# Patient Record
Sex: MALE
[De-identification: names, ages, dates, MRNs, and addresses within clinical notes are randomized per-mention and may not be internally consistent; named-entity substitution may affect disease eponyms.]

---

## 2024-06-06 LAB — HBA1C MFR BLD HPLC: 5.9 %

## 2024-08-21 ENCOUNTER — OFFICE VISIT (OUTPATIENT)
Facility: CLINIC | Age: 34
End: 2024-08-21
Payer: OTHER GOVERNMENT

## 2024-08-21 VITALS
SYSTOLIC BLOOD PRESSURE: 120 MMHG | OXYGEN SATURATION: 98 % | WEIGHT: 225.5 LBS | DIASTOLIC BLOOD PRESSURE: 70 MMHG | TEMPERATURE: 97.2 F | HEART RATE: 97 BPM | RESPIRATION RATE: 16 BRPM | HEIGHT: 74 IN | BODY MASS INDEX: 28.94 KG/M2

## 2024-08-21 DIAGNOSIS — R41.840 ATTENTION DEFICIT: ICD-10-CM

## 2024-08-21 DIAGNOSIS — E78.01 FAMILIAL HYPERCHOLESTEREMIA: Primary | ICD-10-CM

## 2024-08-21 DIAGNOSIS — Z91.89 AT RISK FOR OBSTRUCTIVE SLEEP APNEA: ICD-10-CM

## 2024-08-21 DIAGNOSIS — R73.03 PREDIABETES: ICD-10-CM

## 2024-08-21 PROCEDURE — 99204 OFFICE O/P NEW MOD 45 MIN: CPT | Performed by: STUDENT IN AN ORGANIZED HEALTH CARE EDUCATION/TRAINING PROGRAM

## 2024-08-21 RX ORDER — ROSUVASTATIN CALCIUM 40 MG/1
40 TABLET, COATED ORAL NIGHTLY
COMMUNITY
Start: 2024-06-17

## 2024-08-21 SDOH — ECONOMIC STABILITY: FOOD INSECURITY: WITHIN THE PAST 12 MONTHS, YOU WORRIED THAT YOUR FOOD WOULD RUN OUT BEFORE YOU GOT MONEY TO BUY MORE.: NEVER TRUE

## 2024-08-21 SDOH — ECONOMIC STABILITY: FOOD INSECURITY: WITHIN THE PAST 12 MONTHS, THE FOOD YOU BOUGHT JUST DIDN'T LAST AND YOU DIDN'T HAVE MONEY TO GET MORE.: NEVER TRUE

## 2024-08-21 SDOH — ECONOMIC STABILITY: INCOME INSECURITY: HOW HARD IS IT FOR YOU TO PAY FOR THE VERY BASICS LIKE FOOD, HOUSING, MEDICAL CARE, AND HEATING?: NOT HARD AT ALL

## 2024-08-21 ASSESSMENT — PATIENT HEALTH QUESTIONNAIRE - PHQ9
4. FEELING TIRED OR HAVING LITTLE ENERGY: SEVERAL DAYS
SUM OF ALL RESPONSES TO PHQ QUESTIONS 1-9: 3
6. FEELING BAD ABOUT YOURSELF - OR THAT YOU ARE A FAILURE OR HAVE LET YOURSELF OR YOUR FAMILY DOWN: NOT AT ALL
3. TROUBLE FALLING OR STAYING ASLEEP: SEVERAL DAYS
SUM OF ALL RESPONSES TO PHQ QUESTIONS 1-9: 3
10. IF YOU CHECKED OFF ANY PROBLEMS, HOW DIFFICULT HAVE THESE PROBLEMS MADE IT FOR YOU TO DO YOUR WORK, TAKE CARE OF THINGS AT HOME, OR GET ALONG WITH OTHER PEOPLE: NOT DIFFICULT AT ALL
5. POOR APPETITE OR OVEREATING: SEVERAL DAYS
2. FEELING DOWN, DEPRESSED OR HOPELESS: NOT AT ALL
SUM OF ALL RESPONSES TO PHQ QUESTIONS 1-9: 3
SUM OF ALL RESPONSES TO PHQ QUESTIONS 1-9: 3
SUM OF ALL RESPONSES TO PHQ9 QUESTIONS 1 & 2: 0
7. TROUBLE CONCENTRATING ON THINGS, SUCH AS READING THE NEWSPAPER OR WATCHING TELEVISION: NOT AT ALL
1. LITTLE INTEREST OR PLEASURE IN DOING THINGS: NOT AT ALL
8. MOVING OR SPEAKING SO SLOWLY THAT OTHER PEOPLE COULD HAVE NOTICED. OR THE OPPOSITE, BEING SO FIGETY OR RESTLESS THAT YOU HAVE BEEN MOVING AROUND A LOT MORE THAN USUAL: NOT AT ALL
9. THOUGHTS THAT YOU WOULD BE BETTER OFF DEAD, OR OF HURTING YOURSELF: NOT AT ALL

## 2024-08-21 NOTE — PROGRESS NOTES
David Russell (: 1990) is a 33 y.o. male, new patient, here for evaluation of the following chief complaint(s):  Establish Care (No concerns)        Assessment & Plan  1.  Familial hypercholesterolemia  The patient's LDL cholesterol was 233 which is suggestive of familial hypercholesterolemia.   He has been on rosuvastatin but reported inconsistency in taking the medication due to a recent move. He has resumed regular use for the past week and a half. The benefits and potential side effects of rosuvastatin, including muscle aches, were discussed. He was advised to continue taking rosuvastatin at the current dosage. A fasting blood draw will be scheduled in a month to monitor his cholesterol levels and determine if further adjustments are needed.    2. Sleep Apnea.  The patient reports symptoms suggestive of sleep apnea, including snoring and pausing breathing during sleep. He has a referral for a sleep study from a previous provider but was unable to complete it due to illness. A new referral to a sleep medicine specialist will be made for further evaluation.    3. Prediabetes.  The patient's A1c level is 5.9, indicating borderline prediabetes. He was advised to maintain a healthy diet, focusing on moderating carbohydrate intake and avoiding concentrated sweets like juice, sweet tea and sodas. His blood sugar levels will be rechecked during his next visit.    4. ADHD.  The patient has a history of ADHD and was previously on Vyvanse until about three years ago. He expressed interest in resuming the medication. He was advised to bring his old medical records, including the original diagnosis and treatment details, to the next visit for further discussion about restarting Vyvanse.    Follow-up  The patient will follow up in 1 month.    Results  Laboratory Studies  Cholesterol levels were high. A1c was 5.9.  1. At risk for obstructive sleep apnea  -     Amb External Referral To Sleep Medicine  2.

## 2024-08-21 NOTE — PROGRESS NOTES
Chief Complaint   Patient presents with    Establish Care     No concerns       1. \"Have you been to the ER, urgent care clinic since your last visit?  Hospitalized since your last visit?\" No    2. \"Have you seen or consulted any other health care providers outside of the [facility] since your last visit?\" No     3. For patients aged 45-75: Has the patient had a colonoscopy / FIT/ Cologuard? NA - based on age

## 2024-09-14 ENCOUNTER — HOSPITAL ENCOUNTER (OUTPATIENT)
Facility: HOSPITAL | Age: 34
Discharge: HOME | End: 2024-09-17
Payer: OTHER GOVERNMENT

## 2024-09-14 DIAGNOSIS — R73.03 PREDIABETES: ICD-10-CM

## 2024-09-14 DIAGNOSIS — E78.01 FAMILIAL HYPERCHOLESTEREMIA: ICD-10-CM

## 2024-09-14 LAB
CHOLEST SERPL-MCNC: 161 MG/DL
EST. AVERAGE GLUCOSE BLD GHB EST-MCNC: 117 MG/DL
HBA1C MFR BLD: 5.7 % (ref 4.2–5.6)
HDLC SERPL-MCNC: 55 MG/DL (ref 40–60)
HDLC SERPL: 2.9 (ref 0–5)
LDLC SERPL CALC-MCNC: 85.2 MG/DL (ref 0–100)
LIPID PANEL: NORMAL
TRIGL SERPL-MCNC: 104 MG/DL
VLDLC SERPL CALC-MCNC: 20.8 MG/DL

## 2024-09-14 PROCEDURE — 83036 HEMOGLOBIN GLYCOSYLATED A1C: CPT

## 2024-09-14 PROCEDURE — 36415 COLL VENOUS BLD VENIPUNCTURE: CPT

## 2024-09-14 PROCEDURE — 80061 LIPID PANEL: CPT

## 2024-09-24 ENCOUNTER — OFFICE VISIT (OUTPATIENT)
Facility: CLINIC | Age: 34
End: 2024-09-24
Payer: OTHER GOVERNMENT

## 2024-09-24 ENCOUNTER — TELEPHONE (OUTPATIENT)
Facility: CLINIC | Age: 34
End: 2024-09-24

## 2024-09-24 VITALS
TEMPERATURE: 97.3 F | WEIGHT: 232.4 LBS | HEART RATE: 90 BPM | SYSTOLIC BLOOD PRESSURE: 110 MMHG | DIASTOLIC BLOOD PRESSURE: 76 MMHG | HEIGHT: 74 IN | OXYGEN SATURATION: 97 % | BODY MASS INDEX: 29.82 KG/M2

## 2024-09-24 DIAGNOSIS — F90.2 ATTENTION DEFICIT HYPERACTIVITY DISORDER (ADHD), COMBINED TYPE: Primary | ICD-10-CM

## 2024-09-24 DIAGNOSIS — E78.01 FAMILIAL HYPERCHOLESTEREMIA: ICD-10-CM

## 2024-09-24 DIAGNOSIS — F90.2 ATTENTION DEFICIT HYPERACTIVITY DISORDER (ADHD), COMBINED TYPE: ICD-10-CM

## 2024-09-24 PROCEDURE — 99214 OFFICE O/P EST MOD 30 MIN: CPT | Performed by: STUDENT IN AN ORGANIZED HEALTH CARE EDUCATION/TRAINING PROGRAM

## 2024-09-24 RX ORDER — LISDEXAMFETAMINE DIMESYLATE 70 MG/1
70 CAPSULE ORAL DAILY
Qty: 30 CAPSULE | Refills: 0 | Status: SHIPPED | OUTPATIENT
Start: 2024-09-24 | End: 2024-10-24

## 2024-09-24 RX ORDER — ROSUVASTATIN CALCIUM 40 MG/1
40 TABLET, COATED ORAL NIGHTLY
Qty: 90 TABLET | Refills: 3 | Status: SHIPPED | OUTPATIENT
Start: 2024-09-24

## 2024-09-24 RX ORDER — DEXTROAMPHETAMINE SACCHARATE, AMPHETAMINE ASPARTATE, DEXTROAMPHETAMINE SULFATE AND AMPHETAMINE SULFATE 5; 5; 5; 5 MG/1; MG/1; MG/1; MG/1
20 TABLET ORAL DAILY
Qty: 30 TABLET | Refills: 0 | Status: SHIPPED | OUTPATIENT
Start: 2024-11-23 | End: 2024-12-23

## 2024-09-24 RX ORDER — LISDEXAMFETAMINE DIMESYLATE 70 MG/1
70 CAPSULE ORAL DAILY
Qty: 30 CAPSULE | Refills: 0 | Status: SHIPPED | OUTPATIENT
Start: 2024-11-23 | End: 2024-12-23

## 2024-09-24 RX ORDER — DEXTROAMPHETAMINE SACCHARATE, AMPHETAMINE ASPARTATE, DEXTROAMPHETAMINE SULFATE AND AMPHETAMINE SULFATE 5; 5; 5; 5 MG/1; MG/1; MG/1; MG/1
20 TABLET ORAL DAILY
Qty: 30 TABLET | Refills: 0 | Status: SHIPPED | OUTPATIENT
Start: 2024-09-24 | End: 2024-10-24

## 2024-09-24 RX ORDER — DEXTROAMPHETAMINE SACCHARATE, AMPHETAMINE ASPARTATE, DEXTROAMPHETAMINE SULFATE AND AMPHETAMINE SULFATE 5; 5; 5; 5 MG/1; MG/1; MG/1; MG/1
20 TABLET ORAL DAILY
Qty: 30 TABLET | Refills: 0 | Status: SHIPPED | OUTPATIENT
Start: 2024-10-24 | End: 2024-11-23

## 2024-09-24 RX ORDER — LISDEXAMFETAMINE DIMESYLATE 70 MG/1
70 CAPSULE ORAL DAILY
Qty: 30 CAPSULE | Refills: 0 | Status: SHIPPED | OUTPATIENT
Start: 2024-10-24 | End: 2024-11-23

## 2024-09-26 RX ORDER — LISDEXAMFETAMINE DIMESYLATE 70 MG/1
70 CAPSULE ORAL EVERY MORNING
Qty: 30 CAPSULE | Refills: 0 | Status: SHIPPED | OUTPATIENT
Start: 2024-10-25 | End: 2024-11-24

## 2024-09-26 RX ORDER — LISDEXAMFETAMINE DIMESYLATE 70 MG/1
70 CAPSULE ORAL EVERY MORNING
Qty: 30 CAPSULE | Refills: 0 | Status: SHIPPED | OUTPATIENT
Start: 2024-11-25 | End: 2024-12-24

## 2024-09-26 RX ORDER — LISDEXAMFETAMINE DIMESYLATE 70 MG/1
70 CAPSULE ORAL EVERY MORNING
Qty: 30 CAPSULE | Refills: 0 | Status: SHIPPED | OUTPATIENT
Start: 2024-09-26 | End: 2024-10-26

## 2024-09-30 ENCOUNTER — TELEPHONE (OUTPATIENT)
Facility: CLINIC | Age: 34
End: 2024-09-30

## 2024-12-30 ENCOUNTER — OFFICE VISIT (OUTPATIENT)
Facility: CLINIC | Age: 34
End: 2024-12-30
Payer: OTHER GOVERNMENT

## 2024-12-30 VITALS
RESPIRATION RATE: 16 BRPM | DIASTOLIC BLOOD PRESSURE: 78 MMHG | WEIGHT: 239.25 LBS | TEMPERATURE: 96.1 F | OXYGEN SATURATION: 96 % | SYSTOLIC BLOOD PRESSURE: 120 MMHG | HEART RATE: 85 BPM | BODY MASS INDEX: 30.93 KG/M2

## 2024-12-30 DIAGNOSIS — F90.2 ATTENTION DEFICIT HYPERACTIVITY DISORDER (ADHD), COMBINED TYPE: Primary | ICD-10-CM

## 2024-12-30 PROCEDURE — 99214 OFFICE O/P EST MOD 30 MIN: CPT | Performed by: STUDENT IN AN ORGANIZED HEALTH CARE EDUCATION/TRAINING PROGRAM

## 2024-12-30 RX ORDER — LISDEXAMFETAMINE DIMESYLATE 70 MG/1
70 CAPSULE ORAL DAILY
Qty: 30 CAPSULE | Refills: 0 | Status: SHIPPED | OUTPATIENT
Start: 2025-01-29 | End: 2025-02-28

## 2024-12-30 RX ORDER — LISDEXAMFETAMINE DIMESYLATE 70 MG/1
70 CAPSULE ORAL DAILY
Qty: 30 CAPSULE | Refills: 0 | Status: SHIPPED | OUTPATIENT
Start: 2025-02-28 | End: 2025-03-30

## 2024-12-30 RX ORDER — DEXTROAMPHETAMINE SACCHARATE, AMPHETAMINE ASPARTATE, DEXTROAMPHETAMINE SULFATE AND AMPHETAMINE SULFATE 5; 5; 5; 5 MG/1; MG/1; MG/1; MG/1
20 TABLET ORAL DAILY
Qty: 30 TABLET | Refills: 0 | Status: SHIPPED | OUTPATIENT
Start: 2025-01-29 | End: 2025-02-28

## 2024-12-30 RX ORDER — DEXTROAMPHETAMINE SACCHARATE, AMPHETAMINE ASPARTATE, DEXTROAMPHETAMINE SULFATE AND AMPHETAMINE SULFATE 5; 5; 5; 5 MG/1; MG/1; MG/1; MG/1
20 TABLET ORAL DAILY
Qty: 30 TABLET | Refills: 0 | Status: SHIPPED | OUTPATIENT
Start: 2025-02-28 | End: 2025-03-30

## 2024-12-30 RX ORDER — DEXTROAMPHETAMINE SACCHARATE, AMPHETAMINE ASPARTATE, DEXTROAMPHETAMINE SULFATE AND AMPHETAMINE SULFATE 5; 5; 5; 5 MG/1; MG/1; MG/1; MG/1
20 TABLET ORAL DAILY
Qty: 30 TABLET | Refills: 0 | Status: SHIPPED | OUTPATIENT
Start: 2024-12-30 | End: 2025-01-29

## 2024-12-30 RX ORDER — LISDEXAMFETAMINE DIMESYLATE 70 MG/1
70 CAPSULE ORAL DAILY
Qty: 30 CAPSULE | Refills: 0 | Status: SHIPPED | OUTPATIENT
Start: 2024-12-30 | End: 2025-01-29

## 2024-12-30 NOTE — PROGRESS NOTES
David Russell (: 1990) is a 34 y.o. male, established patient, here for evaluation of the following chief complaint(s):  ADHD (Follow up no concerns.)        Assessment & Plan  1.  ADHD  -repeat blood pressure is 120/78 mmHg.  No significant side effects noted  -  reviewed and appropriate.   - Continue current medication regimen  - UDS and CSC up to date  - Refills sent to [facility]    2.  Familial hypercholesterolemia- Currently on 40 mg rosuvastatin  -Spouse concerned about potential long-term risks, including liver damage and diabetes.  Discussed balancing the risks of very high cholesterol and the risks of statins.  - Cholesterol levels were very high when off medication  - Continue current dosage  - Monitor liver function a during next workup    Follow-up in 3 months    Results    1. Attention deficit hyperactivity disorder (ADHD), combined type  -     lisdexamfetamine (VYVANSE) 70 MG capsule; Take 1 capsule by mouth daily for 30 days. Max Daily Amount: 70 mg, Disp-30 capsule, R-0Normal  -     lisdexamfetamine (VYVANSE) 70 MG capsule; Take 1 capsule by mouth daily for 30 days. Max Daily Amount: 70 mg, Disp-30 capsule, R-0Normal  -     lisdexamfetamine (VYVANSE) 70 MG capsule; Take 1 capsule by mouth daily for 30 days. Max Daily Amount: 70 mg, Disp-30 capsule, R-0Normal  -     amphetamine-dextroamphetamine (ADDERALL) 20 MG tablet; Take 1 tablet by mouth daily for 30 days. Max Daily Amount: 20 mg, Disp-30 tablet, R-0Normal  -     amphetamine-dextroamphetamine (ADDERALL) 20 MG tablet; Take 1 tablet by mouth daily for 30 days. Max Daily Amount: 20 mg, Disp-30 tablet, R-0Normal  -     amphetamine-dextroamphetamine (ADDERALL) 20 MG tablet; Take 1 tablet by mouth daily for 30 days. Max Daily Amount: 20 mg, Disp-30 tablet, R-0Normal    Return in 3 months (on 3/30/2025) for ADHD follow up.         Subjective   History of Present Illness  The patient presents for evaluation of blood pressure and

## 2024-12-31 ENCOUNTER — TELEPHONE (OUTPATIENT)
Facility: CLINIC | Age: 34
End: 2024-12-31

## 2024-12-31 DIAGNOSIS — F90.2 ATTENTION DEFICIT HYPERACTIVITY DISORDER (ADHD), COMBINED TYPE: Primary | ICD-10-CM

## 2024-12-31 NOTE — TELEPHONE ENCOUNTER
Fax received from Cover [employer] stating prior auth is required for thel  lisdexamfetamine (VYVANSE) 70 MG capsule    Submit a PA request  1. Go to [email or web address] and click \"Enter a Key\"  2. Patient last name: Drew      : 1990      Key: [id number]  3. Click \"start a PA\", complete the form, and \"send to plan\"

## 2025-01-02 NOTE — TELEPHONE ENCOUNTER
Vyvanse should be covered as brand name. Tried to call [facility] to see if they can run through insurance as brand name no answer will call back later.

## 2025-01-07 ENCOUNTER — TELEPHONE (OUTPATIENT)
Facility: CLINIC | Age: 35
End: 2025-01-07

## 2025-01-07 RX ORDER — LISDEXAMFETAMINE DIMESYLATE 70 MG/1
70 CAPSULE ORAL EVERY MORNING
Qty: 30 CAPSULE | Refills: 0 | Status: SHIPPED | OUTPATIENT
Start: 2025-03-08 | End: 2025-04-07

## 2025-01-07 RX ORDER — LISDEXAMFETAMINE DIMESYLATE 70 MG/1
70 CAPSULE ORAL EVERY MORNING
Qty: 30 CAPSULE | Refills: 0 | Status: SHIPPED | OUTPATIENT
Start: 2025-01-07 | End: 2025-02-06

## 2025-01-07 RX ORDER — LISDEXAMFETAMINE DIMESYLATE 70 MG/1
70 CAPSULE ORAL EVERY MORNING
Qty: 30 CAPSULE | Refills: 0 | Status: SHIPPED | OUTPATIENT
Start: 2025-02-06 | End: 2025-03-08

## 2025-01-07 NOTE — TELEPHONE ENCOUNTER
Fax received from [employer] stating prior auth is required for the lisdexamfetamine (VYVANSE) 70 MG capsule   Submit a PA request  1. Go to [email or web address] and click \"Enter a Key\"  2. Patient last name: Drew      : [birth year]      Key: [id number]  3. Click \"start a PA\", complete the form, and \"send to plan\"

## 2025-01-10 ENCOUNTER — TELEPHONE (OUTPATIENT)
Facility: CLINIC | Age: 35
End: 2025-01-10

## 2025-01-10 NOTE — TELEPHONE ENCOUNTER
Close reason: Prior Authorization not required for patient/medication  Payer: Auto Search Patient's Payer Case ID: [id number]    [phone]  Note from payer: [facility] has predicted that this prior auth will not be required.  View History

## 2025-03-31 ENCOUNTER — TELEPHONE (OUTPATIENT)
Facility: CLINIC | Age: 35
End: 2025-03-31

## 2025-03-31 ENCOUNTER — OFFICE VISIT (OUTPATIENT)
Facility: CLINIC | Age: 35
End: 2025-03-31
Payer: OTHER GOVERNMENT

## 2025-03-31 VITALS
TEMPERATURE: 96.7 F | RESPIRATION RATE: 16 BRPM | HEIGHT: 74 IN | DIASTOLIC BLOOD PRESSURE: 82 MMHG | HEART RATE: 96 BPM | SYSTOLIC BLOOD PRESSURE: 124 MMHG | WEIGHT: 236.38 LBS | BODY MASS INDEX: 30.34 KG/M2 | OXYGEN SATURATION: 96 %

## 2025-03-31 DIAGNOSIS — F90.2 ATTENTION DEFICIT HYPERACTIVITY DISORDER (ADHD), COMBINED TYPE: Primary | ICD-10-CM

## 2025-03-31 DIAGNOSIS — J30.1 SEASONAL ALLERGIC RHINITIS DUE TO POLLEN: ICD-10-CM

## 2025-03-31 PROCEDURE — 99214 OFFICE O/P EST MOD 30 MIN: CPT | Performed by: STUDENT IN AN ORGANIZED HEALTH CARE EDUCATION/TRAINING PROGRAM

## 2025-03-31 RX ORDER — LISDEXAMFETAMINE DIMESYLATE 70 MG/1
70 CAPSULE ORAL DAILY
Qty: 30 CAPSULE | Refills: 0 | Status: SHIPPED | OUTPATIENT
Start: 2025-03-31 | End: 2025-04-30

## 2025-03-31 RX ORDER — DEXTROAMPHETAMINE SACCHARATE, AMPHETAMINE ASPARTATE, DEXTROAMPHETAMINE SULFATE AND AMPHETAMINE SULFATE 5; 5; 5; 5 MG/1; MG/1; MG/1; MG/1
20 TABLET ORAL DAILY
Qty: 30 TABLET | Refills: 0 | Status: SHIPPED | OUTPATIENT
Start: 2025-05-30 | End: 2025-06-29

## 2025-03-31 RX ORDER — DEXTROAMPHETAMINE SACCHARATE, AMPHETAMINE ASPARTATE, DEXTROAMPHETAMINE SULFATE AND AMPHETAMINE SULFATE 5; 5; 5; 5 MG/1; MG/1; MG/1; MG/1
20 TABLET ORAL DAILY
Qty: 30 TABLET | Refills: 0 | Status: SHIPPED | OUTPATIENT
Start: 2025-04-30 | End: 2025-05-30

## 2025-03-31 RX ORDER — LISDEXAMFETAMINE DIMESYLATE 70 MG/1
70 CAPSULE ORAL DAILY
Qty: 30 CAPSULE | Refills: 0 | Status: SHIPPED | OUTPATIENT
Start: 2025-04-30 | End: 2025-05-30

## 2025-03-31 RX ORDER — DEXTROAMPHETAMINE SACCHARATE, AMPHETAMINE ASPARTATE, DEXTROAMPHETAMINE SULFATE AND AMPHETAMINE SULFATE 5; 5; 5; 5 MG/1; MG/1; MG/1; MG/1
20 TABLET ORAL DAILY
Qty: 30 TABLET | Refills: 0 | Status: SHIPPED | OUTPATIENT
Start: 2025-03-31 | End: 2025-04-30

## 2025-03-31 RX ORDER — LISDEXAMFETAMINE DIMESYLATE 70 MG/1
70 CAPSULE ORAL DAILY
Qty: 30 CAPSULE | Refills: 0 | Status: SHIPPED | OUTPATIENT
Start: 2025-05-30 | End: 2025-06-29

## 2025-03-31 SDOH — ECONOMIC STABILITY: FOOD INSECURITY: WITHIN THE PAST 12 MONTHS, THE FOOD YOU BOUGHT JUST DIDN'T LAST AND YOU DIDN'T HAVE MONEY TO GET MORE.: NEVER TRUE

## 2025-03-31 SDOH — ECONOMIC STABILITY: FOOD INSECURITY: WITHIN THE PAST 12 MONTHS, YOU WORRIED THAT YOUR FOOD WOULD RUN OUT BEFORE YOU GOT MONEY TO BUY MORE.: NEVER TRUE

## 2025-03-31 ASSESSMENT — PATIENT HEALTH QUESTIONNAIRE - PHQ9
1. LITTLE INTEREST OR PLEASURE IN DOING THINGS: NOT AT ALL
2. FEELING DOWN, DEPRESSED OR HOPELESS: NOT AT ALL
SUM OF ALL RESPONSES TO PHQ QUESTIONS 1-9: 0

## 2025-03-31 NOTE — TELEPHONE ENCOUNTER
Close reason: Prior Authorization not required for patient/medication  Payer: Auto Search Patient's Payer Case ID: [id number]    [phone]  Note from payer: [facility] has predicted that this prior auth will not be required.  View History

## 2025-03-31 NOTE — PROGRESS NOTES
\"Have you been to the ER, urgent care clinic since your last visit?  Hospitalized since your last visit?\"    YES - When: approximately 1 months ago.  Where and Why: eye scratch.    “Have you seen or consulted any other health care providers outside our system since your last visit?”    NO

## 2025-03-31 NOTE — ASSESSMENT & PLAN NOTE
ADHD Diagnosis  - Several inattentive or hyperactive-impulsive symptoms have been present prior to age 14yo.  - Symptoms not better accounted for by a different psychiatric disorder  - Symptoms not exclusively a manifestation of oppositional behavior.   - Symptoms are interfering with social and work functioning.   - Several symptoms have been present greater than 6 months and are present in at least 2 settings. 5 or more symptoms are present when not adequately on treatment. See ASRS  - Medication history: Vyvanse, adderall.  (Ritalin. Concerta. Caused headaches)  - Prior Neuropsych testing- N/A. Diagnosed at 19yo in [place]. Started on medications during that time.   -  reviewed and appropriate   - [facility] on file  - Last UDS 9/24/24

## 2025-03-31 NOTE — PROGRESS NOTES
David Russell (: 1990) is a 34 y.o. male, established patient, here for evaluation of the following chief complaint(s):  3 Month Follow-Up (No concerns, patient would like to get something for allergies.)        Assessment & Plan  1. Allergic rhinitis: Worsening of chronic seasonal condition.  - Recommend Zaditor eye drops twice daily.  - Suggest Zyrtec-D if not used, caution for potential BP elevation.  - Recommend Flonase nasal spray for sinus symptoms, reduce frequency if epistaxis occurs.  - Advise artificial tears to rinse eyes and remove allergens.  - Recommend wearing a mask in john or pollen-rich environments.  - If symptoms persist after 1-2 weeks, consider Singulair (montelukast). Discussed potential neuropsychiatric side effects.    2. ADHD: Stable.  No significant side effects noted.   reviewed and appropriate  - No current lightheadedness, dizziness, palpitations, or sleep disturbances.  - Afternoon dose of Adderall taken around 1-2 PM.  - Urine drug screen conducted in 2024.  - No issues with medication pickup, aside from prior authorization delays.  -Continue current management with Adderall and Vyvanse    Follow-up  - Follow-up in 3 months.    Results    1. Attention deficit hyperactivity disorder (ADHD), combined type  Assessment & Plan:   ADHD Diagnosis  - Several inattentive or hyperactive-impulsive symptoms have been present prior to age 14yo.  - Symptoms not better accounted for by a different psychiatric disorder  - Symptoms not exclusively a manifestation of oppositional behavior.   - Symptoms are interfering with social and work functioning.   - Several symptoms have been present greater than 6 months and are present in at least 2 settings. 5 or more symptoms are present when not adequately on treatment. See ASRS  - Medication history: Vyvanse, adderall.  (Ritalin. Concerta. Caused headaches)  - Prior Neuropsych testing- N/A. Diagnosed at 19yo in [place]. Started on

## 2025-05-06 ENCOUNTER — TELEPHONE (OUTPATIENT)
Facility: CLINIC | Age: 35
End: 2025-05-06

## 2025-05-06 NOTE — TELEPHONE ENCOUNTER
Patient is requesting (New  Updated) referral to the following office:    Speciality: Sleep Medicine  Specialist Name: unknown  Office Location: unknown  Phone (if available): unknown  Fax (if available): unknown  Diagnosis: Z91.89 (ICD-10-CM) - At risk for obstructive sleep apnea   Date of appointment (if scheduled): unknown    PATIENT REQUESTED NOT TO BE REFERRED TO SAME SLEEP MEDICINE (FROM 08/21/24.)

## 2025-05-21 DIAGNOSIS — Z91.89 AT RISK FOR OBSTRUCTIVE SLEEP APNEA: Primary | ICD-10-CM

## 2025-06-06 RX ORDER — CYCLOBENZAPRINE HCL 10 MG
10 TABLET ORAL 3 TIMES DAILY PRN
COMMUNITY

## 2025-06-06 RX ORDER — METHOCARBAMOL 750 MG/1
750 TABLET, FILM COATED ORAL EVERY 4 HOURS PRN
COMMUNITY
Start: 2025-05-25

## 2025-06-06 RX ORDER — CHLOPHEDIANOL HCL AND PYRILAMINE MALEATE 12.5; 12.5 MG/5ML; MG/5ML
10 SOLUTION ORAL EVERY 8 HOURS PRN
COMMUNITY

## 2025-06-06 RX ORDER — SENNOSIDES 8.6 MG
1300 CAPSULE ORAL EVERY 8 HOURS PRN
COMMUNITY
Start: 2025-05-25

## 2025-06-06 RX ORDER — LIDOCAINE 50 MG/G
1 PATCH TOPICAL EVERY 24 HOURS
COMMUNITY
Start: 2025-05-25

## 2025-06-06 RX ORDER — IBUPROFEN 800 MG/1
800 TABLET, FILM COATED ORAL EVERY 6 HOURS PRN
COMMUNITY
Start: 2025-05-25

## 2025-06-10 ENCOUNTER — OFFICE VISIT (OUTPATIENT)
Age: 35
End: 2025-06-10
Payer: OTHER GOVERNMENT

## 2025-06-10 VITALS
SYSTOLIC BLOOD PRESSURE: 135 MMHG | DIASTOLIC BLOOD PRESSURE: 79 MMHG | OXYGEN SATURATION: 97 % | HEART RATE: 102 BPM | WEIGHT: 230 LBS | TEMPERATURE: 97.6 F | BODY MASS INDEX: 30.48 KG/M2 | RESPIRATION RATE: 18 BRPM | HEIGHT: 73 IN

## 2025-06-10 DIAGNOSIS — G47.33 OSA (OBSTRUCTIVE SLEEP APNEA): Primary | ICD-10-CM

## 2025-06-10 DIAGNOSIS — F90.2 ATTENTION DEFICIT HYPERACTIVITY DISORDER (ADHD), COMBINED TYPE: ICD-10-CM

## 2025-06-10 PROCEDURE — 99204 OFFICE O/P NEW MOD 45 MIN: CPT | Performed by: INTERNAL MEDICINE

## 2025-06-10 ASSESSMENT — PATIENT HEALTH QUESTIONNAIRE - PHQ9
SUM OF ALL RESPONSES TO PHQ QUESTIONS 1-9: 0
2. FEELING DOWN, DEPRESSED OR HOPELESS: NOT AT ALL
SUM OF ALL RESPONSES TO PHQ QUESTIONS 1-9: 0
1. LITTLE INTEREST OR PLEASURE IN DOING THINGS: NOT AT ALL
SUM OF ALL RESPONSES TO PHQ QUESTIONS 1-9: 0
SUM OF ALL RESPONSES TO PHQ QUESTIONS 1-9: 0

## 2025-06-10 ASSESSMENT — SLEEP AND FATIGUE QUESTIONNAIRES
HOW LIKELY ARE YOU TO NOD OFF OR FALL ASLEEP WHILE LYING DOWN TO REST IN THE AFTERNOON WHEN CIRCUMSTANCES PERMIT: SLIGHT CHANCE OF DOZING
HOW LIKELY ARE YOU TO NOD OFF OR FALL ASLEEP WHILE SITTING QUIETLY AFTER LUNCH WITHOUT ALCOHOL: SLIGHT CHANCE OF DOZING
HOW LIKELY ARE YOU TO NOD OFF OR FALL ASLEEP WHILE SITTING AND TALKING TO SOMEONE: WOULD NEVER DOZE
HOW LIKELY ARE YOU TO NOD OFF OR FALL ASLEEP WHILE WATCHING TV: SLIGHT CHANCE OF DOZING
HOW LIKELY ARE YOU TO NOD OFF OR FALL ASLEEP WHILE SITTING AND READING: SLIGHT CHANCE OF DOZING
HOW LIKELY ARE YOU TO NOD OFF OR FALL ASLEEP WHEN YOU ARE A PASSENGER IN A CAR FOR AN HOUR WITHOUT A BREAK: SLIGHT CHANCE OF DOZING
ESS TOTAL SCORE: 6
HOW LIKELY ARE YOU TO NOD OFF OR FALL ASLEEP WHILE SITTING INACTIVE IN A PUBLIC PLACE: SLIGHT CHANCE OF DOZING
HOW LIKELY ARE YOU TO NOD OFF OR FALL ASLEEP IN A CAR, WHILE STOPPED FOR A FEW MINUTES IN TRAFFIC: WOULD NEVER DOZE

## 2025-06-10 NOTE — PROGRESS NOTES
[facility] since your last visit? Include any pap smears or colon screening. no    Medication list has been updated according to patient.  
mouth every 8 hours as needed (Patient not taking: Reported on 6/10/2025)      cyclobenzaprine (FLEXERIL) 10 MG tablet Take 1 tablet by mouth 3 times daily as needed for Muscle spasms (Patient not taking: Reported on 6/10/2025)      diclofenac (VOLTAREN) 50 MG EC tablet Take 1 tablet by mouth 3 times daily (Patient not taking: Reported on 6/10/2025)      lidocaine (LIDODERM) 5 % Place 1 patch onto the skin every 24 hours  (12 hours on, 12 hours off) (Patient not taking: Reported on 6/10/2025)      methocarbamol (ROBAXIN) 750 MG tablet Take 1 tablet by mouth every 4 hours as needed (pain) (Patient not taking: Reported on 6/10/2025)      lisdexamfetamine (VYVANSE) 70 MG capsule Take 1 capsule by mouth daily for 30 days. Max Daily Amount: 70 mg 30 capsule 0    lisdexamfetamine (VYVANSE) 70 MG capsule Take 1 capsule by mouth daily for 30 days. Max Daily Amount: 70 mg 30 capsule 0    amphetamine-dextroamphetamine (ADDERALL) 20 MG tablet Take 1 tablet by mouth daily for 30 days. Max Daily Amount: 20 mg 30 tablet 0    amphetamine-dextroamphetamine (ADDERALL) 20 MG tablet Take 1 tablet by mouth daily for 30 days. Max Daily Amount: 20 mg 30 tablet 0    amphetamine-dextroamphetamine (ADDERALL) 20 MG tablet Take 1 tablet by mouth daily for 30 days. Max Daily Amount: 20 mg 30 tablet 0    VYVANSE 70 MG capsule Take 1 capsule by mouth every morning for 30 days. Max Daily Amount: 70 mg 30 capsule 0    VYVANSE 70 MG capsule Take 1 capsule by mouth every morning for 30 days. Max Daily Amount: 70 mg 30 capsule 0    VYVANSE 70 MG capsule Take 1 capsule by mouth every morning for 30 days. Max Daily Amount: 70 mg 30 capsule 0    lisdexamfetamine (VYVANSE) 70 MG capsule Take 1 capsule by mouth daily for 30 days. Max Daily Amount: 70 mg 30 capsule 0    amphetamine-dextroamphetamine (ADDERALL) 20 MG tablet Take 1 tablet by mouth daily for 30 days. Max Daily Amount: 20 mg 30 tablet 0    amphetamine-dextroamphetamine (ADDERALL) 20 MG tablet

## 2025-06-10 NOTE — PATIENT INSTRUCTIONS
until you feel sleepy.  Avoid taking medicine right before bed that may keep you awake or make you feel hyper or energized. Your doctor can tell you if your medicine may do this and if you can take it earlier in the day.  If you can't sleep   Imagine yourself in a peaceful, pleasant scene. Focus on the details and feelings of being in a place that is relaxing.  Get up and do a quiet or boring activity until you feel sleepy.  Avoid drinking any liquids before going to bed to help prevent waking up often to use the bathroom.  Where can you learn more?  Go to https://www.[employer].net/patientEd and enter J942 to learn more about \"Learning About Sleeping Well.\"  Current as of: July 31, 2024  Content Version: 14.5  © 2024-2025 [employer].   Care instructions adapted under license by [employer]. If you have questions about a medical condition or this instruction, always ask your healthcare professional. [employer] disclaims any warranty or liability for your use of this information.

## 2025-06-16 ENCOUNTER — HOSPITAL ENCOUNTER (OUTPATIENT)
Dept: SLEEP MEDICINE | Facility: HOSPITAL | Age: 35
Discharge: HOME | End: 2025-06-19
Attending: INTERNAL MEDICINE
Payer: OTHER GOVERNMENT

## 2025-06-16 DIAGNOSIS — G47.33 MODERATE OBSTRUCTIVE SLEEP APNEA: Primary | ICD-10-CM

## 2025-06-16 DIAGNOSIS — G47.33 OSA (OBSTRUCTIVE SLEEP APNEA): ICD-10-CM

## 2025-06-16 PROCEDURE — 95800 SLP STDY UNATTENDED: CPT

## 2025-06-17 PROBLEM — G47.33 MODERATE OBSTRUCTIVE SLEEP APNEA: Status: ACTIVE | Noted: 2025-06-17

## 2025-06-17 PROCEDURE — 95800 SLP STDY UNATTENDED: CPT | Performed by: INTERNAL MEDICINE

## 2025-06-17 NOTE — PROCEDURES
Vannessa Lockett M.D  Pulmonary Critical Care & Sleep Medicine    Sleep Study Report      HST reported on 6/17/25   INDICATION: ESS of 7 snoring snore arousal Mallampati of 3 and strong possibility of underlying sleep apnea   CONCLUSION     Moderate  obstructive sleep apnea syndrome with AHI of 20, and respiratory disturbance index (RDI) of 27. Central apnea index 3.    AHI by AASM criteria 20 /hr with drop in volume by 30% and 3% oxygen desaturation & by CMS criteria 13 /hr with drop in volume by 30% and oxygen desaturation of 4%.    REM AHI: 18.    Calculated sleep time: 6 hours 24-minute.  Lowest oxygen saturation 81%.  Oxygen desaturation index 21. Time below 88 for 3 min.    Supine AHI: 51.    Sleep Latency: 25 min. REM latency 71 min.    Significant snoring events reported.    No significant arrhythmia seen.    Home sleep apnea test was completed with WatchPAT, a type III device including the following measurement/data: Peripheral arterial tone, actigraphy, body position, snore, pulse oximetry, sleep staging and heart rate.  This report was generated based on review of flow data from the study  Study was adequate for interpretation    RECOMMENDATIONS:     Patient has follow up appointment on 8/21/2025-will ask our staff to see if patient can come sooner and will discuss results of sleep study and decide further course of action.     Consider sleep apnea treatment as appropriate for this patient.    Treatment options may include positive airway pressure device such as continuous-CPAP, or auto adjusting-APAP and bilevel.  Oral appliance implementation during sleep or Inspire/ Upper airway surgery may be considered in selected patient as deemed suitable by appropriate specialist.    If APAP is utilized for titration and or treatment, initial setting of Auto CPAP: 5-15 is recommended.     If CPAP is utilized without a PAP titration, a sleep center based PAP titration is recommended.    When applicable utilize CPAP

## 2025-06-18 NOTE — PROGRESS NOTES
Vannessa Lockett M.D  Pulmonary Critical Care & Sleep Medicine     Sleep Medicine Note    Name: David Russell     : 1990     Date: 2025      Referring provider: No ref. provider found   PCP: Rosendo Noyola MD   IMPRESSION:   34-year-old gentleman with ESS of 7 snoring snore arousal Mallampati of 3 and strong possibility of underlying sleep apnea  Moderate sleep apnea with AHI of 20, oxygen paul of 81% and time below 88 for 3-minute  Patient's with sleep apnea and comorbid condition with excess daytime sleepiness will benefit from treatment of sleep apnea   patient came for sleep study follow-up, discussed all the treatment options with the patient, including CPAP-auto CPAP, dental device and inspire.  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 and than 3 month compliance>  Restless leg syndrome:   History of difficulty keeping the leg straight before going to sleep, restless sleep and leg movement at nighttime all suggest possibility of restless leg syndrome  Does not have classical symptom of crampy crawly sensation  Optimal treatment of sleep disordered breathing should help  If continue to have issues consider further workup with ferritin and treatment of RLS  Fork :   Does not require any clearance and related to sleep apnea as per the patient  Impact of being a  and sleep apnea discussed with the patient at length  Will plan Auto CPAP  ADHD:   PCP follows  On Adderal for it  Impact of stimulant on sleep discussed   Plan:  Start Auto CPAP 5-15  Follow up in 2 month with compliance download  Optimize and treat underlying sleep disordered breathing if continued to have RLS

## 2025-07-01 ENCOUNTER — OFFICE VISIT (OUTPATIENT)
Facility: CLINIC | Age: 35
End: 2025-07-01
Payer: OTHER GOVERNMENT

## 2025-07-01 VITALS
HEART RATE: 98 BPM | RESPIRATION RATE: 16 BRPM | OXYGEN SATURATION: 96 % | DIASTOLIC BLOOD PRESSURE: 90 MMHG | TEMPERATURE: 97.1 F | BODY MASS INDEX: 31.07 KG/M2 | WEIGHT: 235.5 LBS | SYSTOLIC BLOOD PRESSURE: 130 MMHG

## 2025-07-01 DIAGNOSIS — E78.01 FAMILIAL HYPERCHOLESTEREMIA: ICD-10-CM

## 2025-07-01 DIAGNOSIS — R73.03 PREDIABETES: ICD-10-CM

## 2025-07-01 DIAGNOSIS — F90.2 ATTENTION DEFICIT HYPERACTIVITY DISORDER (ADHD), COMBINED TYPE: Primary | ICD-10-CM

## 2025-07-01 PROCEDURE — 99214 OFFICE O/P EST MOD 30 MIN: CPT | Performed by: STUDENT IN AN ORGANIZED HEALTH CARE EDUCATION/TRAINING PROGRAM

## 2025-07-01 RX ORDER — DEXTROAMPHETAMINE SACCHARATE, AMPHETAMINE ASPARTATE, DEXTROAMPHETAMINE SULFATE AND AMPHETAMINE SULFATE 5; 5; 5; 5 MG/1; MG/1; MG/1; MG/1
20 TABLET ORAL DAILY
Qty: 30 TABLET | Refills: 0 | Status: SHIPPED | OUTPATIENT
Start: 2025-07-01 | End: 2025-07-31

## 2025-07-01 RX ORDER — DEXTROAMPHETAMINE SACCHARATE, AMPHETAMINE ASPARTATE, DEXTROAMPHETAMINE SULFATE AND AMPHETAMINE SULFATE 5; 5; 5; 5 MG/1; MG/1; MG/1; MG/1
20 TABLET ORAL DAILY
Qty: 30 TABLET | Refills: 0 | Status: SHIPPED | OUTPATIENT
Start: 2025-08-30 | End: 2025-09-29

## 2025-07-01 RX ORDER — LISDEXAMFETAMINE DIMESYLATE 70 MG/1
70 CAPSULE ORAL DAILY
Qty: 30 CAPSULE | Refills: 0 | Status: SHIPPED | OUTPATIENT
Start: 2025-08-30 | End: 2025-09-29

## 2025-07-01 RX ORDER — LISDEXAMFETAMINE DIMESYLATE 70 MG/1
70 CAPSULE ORAL DAILY
Qty: 30 CAPSULE | Refills: 0 | Status: SHIPPED | OUTPATIENT
Start: 2025-07-31 | End: 2025-08-30

## 2025-07-01 RX ORDER — DEXTROAMPHETAMINE SACCHARATE, AMPHETAMINE ASPARTATE, DEXTROAMPHETAMINE SULFATE AND AMPHETAMINE SULFATE 5; 5; 5; 5 MG/1; MG/1; MG/1; MG/1
20 TABLET ORAL DAILY
Qty: 30 TABLET | Refills: 0 | Status: SHIPPED | OUTPATIENT
Start: 2025-07-31 | End: 2025-08-30

## 2025-07-01 RX ORDER — LISDEXAMFETAMINE DIMESYLATE 70 MG/1
70 CAPSULE ORAL DAILY
Qty: 30 CAPSULE | Refills: 0 | Status: SHIPPED | OUTPATIENT
Start: 2025-07-01 | End: 2025-07-31

## 2025-07-01 SDOH — ECONOMIC STABILITY: FOOD INSECURITY: WITHIN THE PAST 12 MONTHS, YOU WORRIED THAT YOUR FOOD WOULD RUN OUT BEFORE YOU GOT MONEY TO BUY MORE.: NEVER TRUE

## 2025-07-01 SDOH — ECONOMIC STABILITY: FOOD INSECURITY: WITHIN THE PAST 12 MONTHS, THE FOOD YOU BOUGHT JUST DIDN'T LAST AND YOU DIDN'T HAVE MONEY TO GET MORE.: NEVER TRUE

## 2025-07-01 ASSESSMENT — PATIENT HEALTH QUESTIONNAIRE - PHQ9
2. FEELING DOWN, DEPRESSED OR HOPELESS: NOT AT ALL
SUM OF ALL RESPONSES TO PHQ QUESTIONS 1-9: 0
SUM OF ALL RESPONSES TO PHQ QUESTIONS 1-9: 0
1. LITTLE INTEREST OR PLEASURE IN DOING THINGS: NOT AT ALL
SUM OF ALL RESPONSES TO PHQ QUESTIONS 1-9: 0
SUM OF ALL RESPONSES TO PHQ QUESTIONS 1-9: 0

## 2025-07-01 NOTE — PROGRESS NOTES
David Russell (: 1990) is a 34 y.o. male, established patient, here for evaluation of the following chief complaint(s):  3 Month Follow-Up (Patient has no concerns)        Assessment & Plan  1. ADHD: Stable. No significant SE's noted.   - Vyvanse 70 mg in the afternoon and Adderall 20 mg in the morning.  - Refills sent.  - Urine drug screen next visit.  -  reviewed and appropriate.     2. Prediabetes. High amounts of sugar intake.   - Blood glucose slightly elevated.  - Advised to reduce sugar intake and use artificial sweeteners if needed.  - Fasting labs, including liver function tests, cholesterol panel, and A1c, ordered today; complete a week prior to next appointment.    3. Shoulder pain. Pain after lifting a box awkwardly.  - Overall resolved. Not cardiac.    4. Familial HLD.  - Cholesterol levels normal as of 2024.  - On rosuvastatin 40 mg.  - Fasting labs, including cholesterol panel, ordered today; complete a week prior to next appointment.    Follow-up  - Fasting labs, including liver function tests, cholesterol panel, and A1c, ordered today; complete a week prior to next appointment.    Results  - Labs:    - Blood Glucose Test: Prediabetes range    - Diagnostic Testing:    - Sleep Study: Moderate sleep apnea  1. Attention deficit hyperactivity disorder (ADHD), combined type  -     lisdexamfetamine (VYVANSE) 70 MG capsule; Take 1 capsule by mouth daily for 30 days. Max Daily Amount: 70 mg, Disp-30 capsule, R-0Normal  -     lisdexamfetamine (VYVANSE) 70 MG capsule; Take 1 capsule by mouth daily for 30 days. Max Daily Amount: 70 mg, Disp-30 capsule, R-0Normal  -     lisdexamfetamine (VYVANSE) 70 MG capsule; Take 1 capsule by mouth daily for 30 days. Max Daily Amount: 70 mg, Disp-30 capsule, R-0Normal  -     amphetamine-dextroamphetamine (ADDERALL) 20 MG tablet; Take 1 tablet by mouth daily for 30 days. Max Daily Amount: 20 mg, Disp-30 tablet, R-0Normal  -

## 2025-07-07 ASSESSMENT — SLEEP AND FATIGUE QUESTIONNAIRES
HOW LIKELY ARE YOU TO NOD OFF OR FALL ASLEEP IN A CAR, WHILE STOPPED FOR A FEW MINUTES IN TRAFFIC: SLIGHT CHANCE OF DOZING
HOW LIKELY ARE YOU TO NOD OFF OR FALL ASLEEP WHILE SITTING QUIETLY AFTER LUNCH WITHOUT ALCOHOL: SLIGHT CHANCE OF DOZING
HOW LIKELY ARE YOU TO NOD OFF OR FALL ASLEEP WHEN YOU ARE A PASSENGER IN A CAR FOR AN HOUR WITHOUT A BREAK: MODERATE CHANCE OF DOZING
HOW LIKELY ARE YOU TO NOD OFF OR FALL ASLEEP WHILE SITTING AND TALKING TO SOMEONE: WOULD NEVER DOZE
HOW LIKELY ARE YOU TO NOD OFF OR FALL ASLEEP WHILE LYING DOWN TO REST IN THE AFTERNOON WHEN CIRCUMSTANCES PERMIT: MODERATE CHANCE OF DOZING
HOW LIKELY ARE YOU TO NOD OFF OR FALL ASLEEP WHILE SITTING QUIETLY AFTER LUNCH WITHOUT ALCOHOL: SLIGHT CHANCE OF DOZING
HOW LIKELY ARE YOU TO NOD OFF OR FALL ASLEEP IN A CAR, WHILE STOPPED FOR A FEW MINUTES IN TRAFFIC: SLIGHT CHANCE OF DOZING
HOW LIKELY ARE YOU TO NOD OFF OR FALL ASLEEP WHILE SITTING AND READING: SLIGHT CHANCE OF DOZING
HOW LIKELY ARE YOU TO NOD OFF OR FALL ASLEEP WHILE SITTING AND TALKING TO SOMEONE: WOULD NEVER DOZE
HOW LIKELY ARE YOU TO NOD OFF OR FALL ASLEEP WHILE LYING DOWN TO REST IN THE AFTERNOON WHEN CIRCUMSTANCES PERMIT: MODERATE CHANCE OF DOZING
HOW LIKELY ARE YOU TO NOD OFF OR FALL ASLEEP WHILE SITTING INACTIVE IN A PUBLIC PLACE: SLIGHT CHANCE OF DOZING
ESS TOTAL SCORE: 9
HOW LIKELY ARE YOU TO NOD OFF OR FALL ASLEEP WHILE WATCHING TV: SLIGHT CHANCE OF DOZING
HOW LIKELY ARE YOU TO NOD OFF OR FALL ASLEEP WHILE SITTING INACTIVE IN A PUBLIC PLACE: SLIGHT CHANCE OF DOZING
HOW LIKELY ARE YOU TO NOD OFF OR FALL ASLEEP WHILE WATCHING TV: SLIGHT CHANCE OF DOZING
HOW LIKELY ARE YOU TO NOD OFF OR FALL ASLEEP WHILE SITTING AND READING: SLIGHT CHANCE OF DOZING
HOW LIKELY ARE YOU TO NOD OFF OR FALL ASLEEP WHEN YOU ARE A PASSENGER IN A CAR FOR AN HOUR WITHOUT A BREAK: MODERATE CHANCE OF DOZING

## 2025-07-08 ENCOUNTER — OFFICE VISIT (OUTPATIENT)
Age: 35
End: 2025-07-08
Payer: OTHER GOVERNMENT

## 2025-07-08 VITALS
TEMPERATURE: 97.8 F | OXYGEN SATURATION: 96 % | HEART RATE: 111 BPM | WEIGHT: 235 LBS | BODY MASS INDEX: 31.14 KG/M2 | DIASTOLIC BLOOD PRESSURE: 80 MMHG | SYSTOLIC BLOOD PRESSURE: 116 MMHG | HEIGHT: 73 IN

## 2025-07-08 DIAGNOSIS — G47.33 MODERATE OBSTRUCTIVE SLEEP APNEA: Primary | ICD-10-CM

## 2025-07-08 DIAGNOSIS — F90.2 ATTENTION DEFICIT HYPERACTIVITY DISORDER (ADHD), COMBINED TYPE: ICD-10-CM

## 2025-07-08 PROCEDURE — 99214 OFFICE O/P EST MOD 30 MIN: CPT | Performed by: INTERNAL MEDICINE

## 2025-07-08 ASSESSMENT — PATIENT HEALTH QUESTIONNAIRE - PHQ9
SUM OF ALL RESPONSES TO PHQ QUESTIONS 1-9: 0
1. LITTLE INTEREST OR PLEASURE IN DOING THINGS: NOT AT ALL
SUM OF ALL RESPONSES TO PHQ QUESTIONS 1-9: 0
SUM OF ALL RESPONSES TO PHQ QUESTIONS 1-9: 0
2. FEELING DOWN, DEPRESSED OR HOPELESS: NOT AT ALL
SUM OF ALL RESPONSES TO PHQ QUESTIONS 1-9: 0

## 2025-07-08 NOTE — PROGRESS NOTES
David Russell presents today for   Chief Complaint   Patient presents with    Follow-up     Sleep study       Is someone accompanying this pt? no    Is the patient using any DME equipment during OV? no    -DME Company n/a    Depression Screenin/8/2025    10:04 AM   PHQ-9    Little interest or pleasure in doing things 0   Feeling down, depressed, or hopeless 0   PHQ-2 Score 0   PHQ-9 Total Score 0        [place] Sleepiness Scale:      2025     8:54 AM   Sleep Medicine   Sitting and reading 1   Watching TV 1   Sitting, inactive in a public place (e.g. a theatre or a meeting) 1   As a passenger in a car for an hour without a break 2   Lying down to rest in the afternoon when circumstances permit 2   Sitting and talking to someone 0   Sitting quietly after a lunch without alcohol 1   In a car, while stopped for a few minutes in traffic 1   [place] Sleepiness Score 9        Patient-reported       Stop-Ban/10/2025    10:00 AM   STOP-BANG QUESTIONNAIRE   Are you a loud and/or regular snorer? 1   Do you often feel tired or groggy upon awakening or do you awaken with a headache? 1   Have you been observed to gasp or stop breathing during sleep? 1   Are you often tired or fatigued during wake time hours?  0   Do you fall asleep sitting, reading, watching TV or driving? 0   Do you often have problems with memory or concentration? 0   Do you have or are you being treated for high blood pressure? 1   Recent BMI (Calculated) 30.6   Is BMI greater than 35 kg/m2? 0=No   Age older than 50 years old? 0=No   Is your neck circumference greater than 17 inches (Male) or 16 inches (Female)? 0   Gender - Male 1=Yes   STOP-Bang Total Score 35.6           Coordination of Care:  1. Have you been to the ER, urgent care clinic since your last visit? Hospitalized since your last visit?  no    2. Have you seen or consulted any other health care providers outside of the [facility] since your last visit?

## 2025-08-04 DIAGNOSIS — E78.01 FAMILIAL HYPERCHOLESTEREMIA: ICD-10-CM

## 2025-08-04 RX ORDER — ROSUVASTATIN CALCIUM 40 MG/1
40 TABLET, COATED ORAL NIGHTLY
Qty: 90 TABLET | Refills: 3 | Status: SHIPPED | OUTPATIENT
Start: 2025-08-04